# Patient Record
(demographics unavailable — no encounter records)

---

## 2017-04-17 NOTE — NUR
PT AWAKE, ALERT, ORIENTED, LYING IN BED IN THE DARK, STATES HER PAIN IS A
10/10 ON NUMERIC PAIN SCALE, AND STATES SHE NEEDS A STRONGER DOSE OF MORPHINE,
AS HER TOLERANCE LEVEL IS VERY HIGH. SHE WAS REQUESTING AN INCREASE OF HER
MORPHINE TO 10MG AS GIVEN WHEN SHE WAS IN THE ER. I EXPLAINED TO PT THAT WE
ARE UNABLE TO ADMINISTER THAT HIGH OF A DOSE ON THIS FLOOR. PT STATES HER
CHEST PAIN IS MID-STERNAL, STABBING WITH PAIN RADIATING UNDER HER LEFT BREAST
WITH SPASMS. PT STATES SHE HAS HAD NO RELIEF AS OF YET. PT DENIES ANY OTHER
NEEDS. CONTINUE TO MONITOR CLOSELY. BED LOW, CALL LIGHT IN REACH, SIDE RAILS X
2, HOB 20 DEGREES.

## 2017-04-17 NOTE — NUR
WENT TO GIVE PT REQUESTED PAIN MEDICATION. SHE ASKED WHAT IT WAS AND HOW MANY
MG, I TOLD HER. PT REFUSED TO TAKE IT AND SAID 5MG WASNT STRONG ENOUGH THAT IT
WAS "LIKE TAKING A TIC TAC". I ASKED PT WHAT PAIN MEDICATION SHE TAKES AT HOME
SHE SAID SHE TAKES A NORCO 10 (PT DID NOT REPORT THIS TO ME EARLIER). I
EXPLAINED TO PT THAT A PERCOCET WAS A STRONGER MEDICATION THAN A NORCO. PT
SAYS THEY GAVE HER MORPHINE IN THE ER AND SHE "WANTS THAT". I ASKED PT THAT IF
SHE TAKES THIS PERCOCET AND STILL IN PAIN AFTERWARDS I WILL PAGE THE DR AND
SEE ABOUT SOMETHING ELSE. PT TOOK MEDICATION.

## 2017-04-17 NOTE — NUR
PT HAS ORDER FOR TELE. NO MONITORS CURRENTLY AVAILABLE, MONITOR TECH WILL
ALERT NURSE WHEN MONITOR AVAILABLE

## 2017-04-17 NOTE — NUR
PT HAS BEEN ANXIOUS, TEARFUL, STATES SHE IS HAVING INCREASED STRESS R/T HER 15
Y/O SON. PT STATES HE IS GIVING HER A LOT OF TROUBLE AT HOME AND STATES THAT
SHE FEELS THIS IS CONTRIBUTING TO HER HEALTH PROBLEMS RIGHT NOW. PT HAS HAD
MULTIPLE BOUTS OF NAUSEA WITH VOMITING, PRN PHENERGAN GIVEN PER PT REQUEST.
PTS 24G IV IN RIGHT AC HAS INFILTRATED. WILL RESITE. I HAVE REASSURED PT THAT
WE ARE AND WILL CONTINUE TO DO EVERYTHING WE CAN TO MAKE HER COMFORTABLE AND
KEEP HER SAFE. PT IS STILL HAVING INCREASED CHEST PAIN, UNRELIEVED WITH
MORPHINE AT THIS TIME. CONTINUE TO MONITOR CLOSELY.

## 2017-04-18 NOTE — NUR
PT STATES SHE HAS ACTUALLY BEEN TAKING HER CLONIDINE SCHEDULED SINCE SHE WAS
IN THE ER A MONTH AGO. PT STATES SHE HAS HAD A HEADACHE, WHICH SEEMS TO BE
IMPROVING SINCE TAKING HER CLONIDINE AND APRESOLINE. PT STATES NAUSEA IS
MILDLY BETTER WITH THE IM PHENERGAN. CONTINUE TO MONITOR CLOSELY.

## 2017-04-18 NOTE — NUR
PT HAS VOMITED ALL HER PO MEDS GIVEN AT HS, INCLUDING HER PRN PHENERGAN. PT
HAS CONTINUED TO HAVE NAUSEA WITH VOMITING ALL SHIFT. PT STATES SHE HAS
RECENTLY HAD AN EGD, WHICH WAS NEGATIVE. I DID GIVE A ONE TIME DOSE OF IM
PHENERGAN. YADY BURLESON RN FROM ER, HAS COME TO RESITE PT AT MY REQUEST. WILL
CONTINUE TO MONITOR CLOSELY.

## 2017-04-18 NOTE — NUR
IV RESITED TO RIGHT FOREARM, 22 GAUGE. PT DENIES ANY ACUTE NEEDS AT THIS TIME.
PRN MORPHINE GIVEN, NAUSEA IMPROVING WITH PRN PHENERGAN. B/P ELEVATED, DID
ALSO GIVE PRN APRESOLINE AND CLONIDINE.

## 2017-04-18 NOTE — NUR
ADMINISTERED MORNING MEDICATIONS, AND 650MG OF TYLENOL FOR PAIN LEVEL OF 6/10
TO HEAD. PT ASSESSED AT THIS TIME, NAD NOTED, PT DENIES ANY NEEDS AT THIS
TIME. CALL LIGHT IN REACH, WILL CONTINUE TO MONITOR.

## 2017-04-18 NOTE — NUR
1700-CALLED TO ROOM WITH PATIENT STATING THAT SHE HAS TO GO AMA AS HER SON
JUST GOT PICKED UP AND SHE HAS TO [facility]
MIN. PRANAV ESPINOSA CALLED TO NOTIFY HER OF THIS. AWAITING CALL BACK.
 
1705-PATIENT TO SIGN PAPERWORK FOR AMA AND SALINE LOCK REOMVED WITH CATH TIP
INTACT. PATIENT TO WALK OUT OF ROOM.

## 2017-04-18 NOTE — NUR
RECEIVED REPORT FROM NIGHT SHIFT NURSE ROSALINE RN, PT IN BED, DENIES ANY NEEDS
AT THIS TIME. CALL LIGHT IN REACH, NAD NOTED, WILL CONTINUE TO MONITOR.

## 2017-05-30 NOTE — NUR
PT AWAKE, ALERT, ORIENTED, C/O GENERALIZED ITCHING ALL OVER HER BODY AND THE
BOTTOM OF HER FEET. PT STATES IT BEGAN AFTER RECEIVING BUPRONEX PRN PAIN. WILL
LIST THAT AS AN ALLERGY. PT STATES SHE HAS NOT HAD IT BEFORE, AND DOES NOT
WANT IT AGAIN. PT DENIES ANY OTHER NEEDS. CONTINUE TO MONITOR CLOSELY. BED
LOW, CALL LIGHT IN REACH, SIDE RAILS X 2, HOB FLAT.

## 2017-05-30 NOTE — NUR
ALERT AND ORIENTED X4. ARRIVE TO ROOM VIA WHEELCHAIR FROM ER. AMBULATES TO BED
WITHOUT DIFFICULTY. GAIT STEADY. COMPLAINS OF ABDOMINAL PAIN 8/10. PAIN
MEDICATION GIVEN IN ER AS ORDERED. DENIES SOB. REFUSE SCDs. CONTINUE ADMISSION
PROCESS. BED LOCKED AND LOW. CALL LIGHT IN REACH. TWO SIDERAILS UP.

## 2017-05-31 NOTE — NUR
NURSE ROUNDS 21:30 - PT AWAKE, ALERT, ORIENTED, REQUESTING HER PRN BENADRYL,
PROMETHAZINE, AND NORCO. PTS B/P HAS REMAINED STABLE, SO PT OPTED TO HOLD HER
SCHEDULED CLONIDINE, 50MG OF HYDRALAZINE, AND LABETOLOL 200 MG. PT IS ASKING
ABOUT D/C TOMORROW AND STATES THAT SHE NEEDS TO BE GONE BY 08:30 IN THE A.M.
R/T HER SON BEING LET OF assisted AND [facility] AND
THAT SHE HAS TO BE THERE BY 09:00 WITH HIS PAPERWORK. PT DENIES ANY ACUTE
DISTRESS OR NEEDS AT THIS TIME. CONTINUE TO MONITOR CLOSELY. BED LOW, CALL
LIGHT IN REACH, SIDE RAILS X 2, HOB FLAT.

## 2017-05-31 NOTE — NUR
AM ROUNDS - PT IS AWAKE IN BED ON LEFT SIDE.  PT REFUSES SCD.  PT IS A RESERVE
LEFT ARM, AVF.  PT IS ON RA.  RIGHT BREAST IV, SL.  NO FUTHER NEEDS AT THIS
TIME.  WILL CONTINUE TO MONITOR.

## 2017-05-31 NOTE — NUR
PT IS IN BED.  IV INFUSING TO RIGHT BREAST, NS AT 125CC/HR.  PT STATES SHE
DOES NOT TAKE HER BP MEDS WHEN HER BP /65.  BP MEDS HELD.  PT HAS NO
FUTHER NEEDS AT THIS TIME.  WILL CONTINUE TO MONITOR

## 2017-06-01 NOTE — NUR
Patient Name: MACHO MCKENZIE
Admission Status: ER
Accout number: [id number]
Admission Date: 2017
: 1970
Admission Diagnosis:
Attending: NURIA
Current LOS:  2
 
Anticipated DC Date:
2017
Planned Disposition:  HOME WITH SPOUSE
Primary Insurance: MEDICARE A & B
 
 
Discharge Planning Comments: CM MET WITH PATIENT IN REGARDS TO DISCHARGE
PLANNING/NEEDS. PATIENT STATED D/C PLAN IS TO RETURN HOME WITH SPOUSE. YOHANA MCKENZIE, SPOUSE, [phone], WILL BE HER RIDE HOME, AND SHE SAID HE IS ON
HIS WAY. DENIES ANY DISCHARGE NEEDS. ENCOURAGED HER TO CALL IF SHE THINKS OF
ANY NEEDS BETWEEN THE END OF THIS ASSESSMENT AND WHEN HER  ARRIVES.
 
 
 
: Raquel Malik
 
 
Is the patient Alert and Oriented? Yes
* How many steps to enter\exit or inside your home? 9
* PCP DR COLEMAN
* Pharmacy [facility] ON [address]
* Preadmission Environment Home with Family
* ADLs Independent
* Equipment None
* List name and contact numbers for known caregivers / representatives who
currently or will assist patient after discharge: YOHANA MCKENZIE, SPOUSE,
[phone]
* Community resources currently utilized None
* Additional services required to return to the preadmission environment? No
* Can the patient safely return to the preadmission environment? Yes
* Has this patient been hospitalized within the prior 30 days at any hospital?
No

## 2017-06-01 NOTE — NUR
PT RESTING COMFORTABLY, EASILY ROUSABLE TO VERBAL STIMULI, DENIES ANY ACUTE
NEEDS. CONTINUE TO MONITOR CLOSELY. BED LOW, CALL LIGHT IN REACH, SIDE RAILS X
2, HOB FLAT.